# Patient Record
Sex: MALE | Race: WHITE
[De-identification: names, ages, dates, MRNs, and addresses within clinical notes are randomized per-mention and may not be internally consistent; named-entity substitution may affect disease eponyms.]

---

## 2019-08-12 ENCOUNTER — HOSPITAL ENCOUNTER (EMERGENCY)
Dept: HOSPITAL 59 - ER | Age: 49
Discharge: HOME | End: 2019-08-12
Payer: MEDICAID
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
## 2019-08-12 DIAGNOSIS — F17.210: ICD-10-CM

## 2019-08-12 DIAGNOSIS — S20.212A: Primary | ICD-10-CM

## 2019-08-12 DIAGNOSIS — I10: ICD-10-CM

## 2019-08-12 DIAGNOSIS — E11.9: ICD-10-CM

## 2019-08-12 PROCEDURE — 96372 THER/PROPH/DIAG INJ SC/IM: CPT

## 2019-08-12 PROCEDURE — 99284 EMERGENCY DEPT VISIT MOD MDM: CPT

## 2019-08-12 PROCEDURE — 94010 BREATHING CAPACITY TEST: CPT

## 2019-08-12 NOTE — RADIOLOGY REPORT
EXAM:  RIBS, LEFT W/PA CHEST



HISTORY:  PAIN ANTERIOR LEFT LOWER RIB CAGE AFTER LEANING OVER A PIECE OF WOOD 
TWO DAYS AGO. 



TECHNIQUE: Single view of the chest and two additional views of the left ribs. 



COMPARISON: None. 



FINDINGS: 



CHEST: The lungs are clear. There is no sign of any effusion, contusion, or 
pneumothorax. The heart and mediastinum appear normal. 



LEFT RIBS: There is no evidence of any displaced fractures or any specific bone 
lesions. 



IMPRESSION:  NO EVIDENCE OF ANY SPECIFIC ACUTE LEFT RIB OR CHEST PATHOLOGY. 



JOB NUMBER: [id number]

[facility]

## 2019-08-12 NOTE — EMERGENCY DEPARTMENT RECORD
History of Present Illness





- General


Chief complaint: Pain


Stated complaint: RIB INJURY


Time Seen by Provider: 08/12/19 12:11


Source: Patient


Mode of Arrival: Ambulatory


Limitations: No limitations





- History of Present Illness


Initial comments: 





49 yo male presents with lower left rib pain.  He was working in his pole barn 

and reached over a rafter injuring the rib.  He has some pain with breathing.  

No fever or sputum.  No hemoptysis.  No other injuries.  The area is very focal 

and point tender.  


Onset/Timing: 3


-: Days(s)


Location: Left


History of Same: No


Radiation: Distal


Quality: Sharp


Consistency: Constant


Improves with: Nothing


Worsens with: Other


Associated Symptoms: Denies other symptoms





- Related Data


                                Home Medications











 Medication  Instructions  Recorded  Confirmed  Last Taken


 


Atorvastatin Calcium 20 mg PO DAILY 08/12/19 08/12/19 Unknown


 


Carvedilol [Coreg] 6.25 mg PO BID 08/12/19 08/12/19 Unknown


 


Fluoxetine HCl [Prozac] 40 mg PO DAILY 08/12/19 08/12/19 Unknown


 


Lisinopril 40 mg PO DAILY 08/12/19 08/12/19 Unknown


 


Metformin HCl 1,000 mg PO BID 08/12/19 08/12/19 Unknown


 


Trazodone HCl 50 mg PO QHS 08/12/19 08/12/19 Unknown











                                    Allergies











Allergy/AdvReac Type Severity Reaction Status Date / Time


 


acetaminophen [From [place]] AdvReac  "causes me Verified 08/12/19 12:00





   to black  





   out"  


 


hydrocodone [From [place]] AdvReac  "causes me Verified 08/12/19 12:00





   to black  





   out"  














Travel Screening





- Travel/Exposure Within Last 30 Days


Have you traveled within the last 30 days?: No





Review of Systems


Constitutional: Denies: Chills, Fever, Malaise, Weakness


Eyes: Denies: Eye discharge


ENT: Denies: Congestion, Throat pain


Respiratory: Denies: Cough


Cardiovascular: Reports: Chest pain (left ribs)


Endocrine: Denies: Fatigue, Polydipsia, Polyuria


Gastrointestinal: Denies: Abdominal pain, Diarrhea, Nausea, Vomiting


Genitourinary: Denies: Dysuria, Frequency, Hematuria


Musculoskeletal: Denies: Arthralgia, Back pain, Joint swelling, Myalgia, Neck 

pain


Skin: Denies: Bruising, Change in color, Rash


Neurological: Denies: Headache


Psychiatric: Denies: Anxiety


Hematological/Lymphatic: Denies: Easy bleeding, Easy bruising





Past Medical History





- SOCIAL HISTORY


Smoking Status: Current every day smoker


Alcohol Use: None


Drug Use: None





- RESPIRATORY


Hx Respiratory Disorders: No





- CARDIOVASCULAR


Hx Cardio Disorders: Yes


Hx Hypertension: Yes


Comment:: high cholesterol





- NEURO


Hx Neuro Disorders: No





- GI


Hx GI Disorders: No





- 


Hx Genitourinary Disorders: No





- ENDOCRINE


Hx Endocrine Disorders: Yes


Hx Diabetes: Yes (Type 2)





- MUSCULOSKELETAL


Hx Musculoskeletal Disorders: No





- PSYCH


Hx Psych Problems: Yes


Hx Anxiety: Yes


Hx Depression: Yes





- HEMATOLOGY/ONCOLOGY


Hx Hematology/Oncology Disorders: No





Family Medical History


Any Significant Family History?: Yes


Hx Cancer: Father, Mother





Physical Exam





- General


General Appearance: Alert, Oriented x3, Cooperative, No acute distress


Limitations: No limitations





- Head


Head exam: Atraumatic, Normal inspection


Head exam detail: negative: Abrasion, Contusion





- Eye


Eye exam: Normal appearance, PERRL.  negative: Conjunctival injection, Scleral 

icterus





- ENT


ENT exam: Normal exam


Ear exam: Normal external inspection


Nasal Exam: Normal inspection


Mouth exam: Normal external inspection





- Neck


Neck exam: Normal inspection, Full ROM.  negative: Tenderness





- Respiratory


Respiratory exam: Normal lung sounds bilaterally, Chest wall tenderness.  

negative: Accessory muscle use, Decreased breath sounds, Prolonged expiratory, 

Respiratory distress, Rhonchi, Stridor, Wheezes





- Cardiovascular


Cardiovascular Exam: Regular rate, Normal rhythm, Normal heart sounds


Peripheral Pulses: 2+: Radial (R), Radial (L)





- GI/Abdominal


GI/Abdominal exam: Soft, Other (No LUQ tenderness).  negative: Distended, 

Guarding, Tenderness





- Rectal


Rectal exam: Deferred





- 


 exam: Deferred





- Extremities


Extremities exam: Full ROM.  negative: Normal inspection, Joint swelling, Pedal 

edema, Tenderness





- Back


Back exam: Reports: Normal inspection.  Denies: CVA tenderness (R), CVA 

tenderness (L), Muscle spasm, Paraspinal tenderness, Tenderness, Vertebral t

enderness





- Neurological


Neurological exam: Alert, Oriented X3





- Psychiatric


Psychiatric exam: Normal affect, Normal mood.  negative: Agitated, Anxious





- Skin


Skin exam: Dry, Intact, Normal color, Warm





Course





                                   Vital Signs











  08/12/19





  11:55


 


Temperature 98.1 F


 


Pulse Rate 81


 


Respiratory 20





Rate 


 


Blood Pressure 140/82


 


Pulse Ox 98














- Reevaluation(s)


Reevaluation #1: 





08/12/19 13:40


The XR of the ribs and chest were negative for acute process


I recommend I/S at home with symptomatic, supportive treatment


We discussed reasons to return and close follow up with the PCP. 





Disposition


Disposition: Discharge


Clinical Impression: 


Contusion of rib on left side


Qualifiers:


 Encounter type: initial encounter Qualified Code(s): S20.212A - Contusion of 

left front wall of thorax, initial encounter





Disposition: Home, Self-Care


Condition: (1) Good


Instructions:  Rib Contusion (ED)


Additional Instructions: 


Call your doctor for the next available follow up appointment


Review this ER visit and the tests performed with your family doctor


Return to the ER for a recheck if worse, any new concerns or questions


Take the prescriptions provided as directed


Forms:  Patient Portal Access


Time of Disposition: 13:41





Quality





- Quality Measures


Quality Measures: N/A





- Blood Pressure Screening


Does Patient Have Any of the Following: No


Blood Pressure Classification: Hypertensive Reading


Systolic Measurement: 139


Diastolic Measurement: 96


Screening for High Blood Pressure: < Pre-Hypertensive BP, F/U Documented > 

[]


Pre-Hypertensive Follow-up Interventions: Referral to alternative/primary care 

provider.